# Patient Record
Sex: FEMALE | Race: WHITE | NOT HISPANIC OR LATINO | Employment: FULL TIME | URBAN - METROPOLITAN AREA
[De-identification: names, ages, dates, MRNs, and addresses within clinical notes are randomized per-mention and may not be internally consistent; named-entity substitution may affect disease eponyms.]

---

## 2018-12-27 ENCOUNTER — OFFICE VISIT CONVERTED (OUTPATIENT)
Dept: ONCOLOGY | Facility: HOSPITAL | Age: 38
End: 2018-12-27
Attending: INTERNAL MEDICINE

## 2021-05-28 VITALS
OXYGEN SATURATION: 100 % | SYSTOLIC BLOOD PRESSURE: 124 MMHG | HEIGHT: 68 IN | DIASTOLIC BLOOD PRESSURE: 75 MMHG | TEMPERATURE: 97.7 F | HEART RATE: 84 BPM | RESPIRATION RATE: 16 BRPM | BODY MASS INDEX: 31.14 KG/M2 | WEIGHT: 205.47 LBS

## 2021-05-28 NOTE — PROGRESS NOTES
Patient: EDMUND WEBSTER     Acct: [id number]     Report: #[id number]  UNIT #: [id number]     : 1980    Encounter Date:2018  PRIMARY CARE: NITIN LIANG  ***Signed***  --------------------------------------------------------------------------------------------------------------------  NURSE INTAKE      Visit Type      New Patient Visit            Chief Complaint      UNEXPLAINED BRUISING            Referring Provider/Copies To      Referring Provider:  NITIN LIANG      Primary Care Provider:  NITIN LIANG            History and Present Illness      HPI - Hematology Interim      38-year-old white female who presents today for evaluation of bruising.  Patient    states that she noticed a rash on her trunk and lower extremities in particular     which started a couple of months ago.  She denies any new medications changes in    her toiletry articles etc.  The areas were very itchy and she had a lot of     bruising from scratching.  She was treated with topicals and then steroid     Dosepak which helped some.  She was finally started on Atarax and noted that the    itching has resolved and the rash cleared up.  The bruising is now also majority    really cleared.  She has never had issues with bruising in the past of an     excessive nature.  She has had prior tooth extractions and surgeries without     difficulty.  She had no excessive bleeding with her vaginal deliveries of her     children.  She has no family history of bleeding diathesis.  She is not on any     new medications, over-the-counter remedies or herbal supplements.            Most Recent Lab Findings      18 WBC 7.1, Hgb 13.6, HCT 44.0, MCV 93.2, , sedimentation rate 1      18 BUN 13, creatinine 1, AST 20, ALT 14, alkaline phosphatase 45, T protein    7.2, albumin 4.1, calcium 10.7 otherwise CMP normal            PAST, FAMILY   Past Medical History      Past Medical History:  High Cholesterol       Hematology/Oncology (F):  Anemia            Past Surgical History      BREAST IMPLANTS, ABLASION, D        Family History      FATHER HAS HEART PROBLEMS and stroke. MOTHER AND FATHER STILL LIVING.            Social History      Marital Status:        Lives independently:  Yes      Number of Children:  3      Occupation:  [employer]            Tobacco Use      Tobacco status:  Never smoker            Alcohol Use      Alcohol intake:  None            Substance Use      Substance use:  Denies use            REVIEW OF SYSTEMS      General:  Admits: Fatigue, Weight Gain;          Denies: Appetite Change, Fever, Night Sweats, Weight Loss      Eye:  Denies: Blurred Vision, Corrective Lenses, Diplopia, Eye Irritation, Eye     Pain, Eye Redness, Spots in Vision, Vision Loss      ENT:  Denies: Headache, Hearing Loss, Hoarseness, Seizures, Sinus Congestion,     Sore Throat      Cardiovascular:  Denies: Chest Pain, Edema Ankles, Edema Legs, Irregular     Heartbeat, Palpitations      Respiratory:  Denies: Coughing Blood, Productive Cough, Shortness of Air,     Wheezing      Gastrointestinal:  Denies: Bloody Stools, Constipation, Diarrhea, Frequent     Heartburn, Nausea, Problem Swallowing, Tarry Stools, Unable to Control Bowels,     Vomiting      Genitourinary (female):  Denies: Blood in Urine, Decrease Urine Stream, Frequent    Urination, Incontinence, Painful Urination      Musculoskeletal:  Denies: Back Pain, Leg Cramps, Muscle Pain, Muscle Weakness,     Painful Joints, Swollen Joints      Integumentary:  Admits: Rash;          Denies: Bleeds Easily, Bruises Easily, Hair Changes, Jaundice, Lesions, Mole     Changes, Nail Changes, Pigment Changes, Skin Discoloration      Neurologic:  Denies: Dizziness, Fainting, Numbness\Tingling, Paralysis, Seizures      Psychiatric:  Denies: Anxiety, Confused, Depression, Disoriented, Memory Loss      Endocrine:  Denies: Cold Intolerance, Diabetes, Excessive Sweating,  Excessive     Thirst, Excessive Urination, Heat Intolerance, Flushing, Hyperthyroidism,     Hypothyroidism      Hematologic/Lymphatic:  Admits: Bruising;          Denies: Bleeding, Enlarged Lymph Nodes, Recurrent Infections, Transfusions            VITAL SIGNS AND SCORES      Vitals      Height 5 ft 7.87 in / 172.4 cm      Weight 205 lbs 7.500 oz / 93.2 kg      BSA 2.06 m2      BMI 31.4 kg/m2      Temperature 97.7 F / 36.5 C - Temporal      Pulse 84      Respirations 16      Blood Pressure 124/75 Sitting, Right Arm      Pulse Oximetry 100%, RM AIR            Pain Score      Pain Scale Used:  Numerical      Pain Intensity:  0            Fatigue Score      Fatigue (0-10 scale):  3            EXAM      General Appearance:  Positive for: Alert, Oriented x3, Cooperative;          Negative for: Acute Distress      Neck:  Positive for: Supple;          Negative for: JVD, Masses      Respiratory:  Positive for: CTAB, Normal Respiratory Effort      Abdomen/Gastro:  Positive for: Soft;          Negative for: Distention, Hepatosplenomegaly, Tenderness      Cardiovascular:  Positive for: RRR;          Negative for: Gallop, Murmur, Peripheral Edema, Rub      Skin:  Negative for: Rash      Other      No bruising or petechiae.      Psychiatric:  Positive for: Appropriate Affect, Intact Judgement      Lower Extremities:  Negative for: Edema      Lymphatic:  Negative for: Axillary, Cervical, Infraclavicular, Supraclavicular            PREVENTION      Hx Influenza Vaccination:  No      2 or More Falls Past Year?:  No      Fall Past Year with Injury?:  No      Encouraged to follow-up with:  PCP regarding preventative exams.      Chart initiated by      RENITA LITTLEJOHN MA            ALLERGY/MEDS      Allergies      Coded Allergies:             NO KNOWN DRUG ALLERGIES (Verified  Allergy, Unknown, 12/27/18)            Medications      Last Reconciled on 12/27/18 14:12 by MANI BUENO      Cholecalciferol (Vitamin D*) 2,000 Unit Cap       2000 UNITS PO QDAY, #30 CAP 0 Refills         Reported         12/27/18       Fexofenadine Hcl (Fexofenadine Hcl) 180 Mg Tablet      180 MG PO QDAY, #30 TAB 0 Refills         Reported         12/27/18       hydrOXYzine HCL (Atarax) 10 Mg Tablet      10 MG PO Q4H PRN for ITCHING, #30 TAB 0 Refills         Reported         12/27/18       Loratadine/Pseudoephedrine 10/240 MG (CLARITIN-D 24 HOUR TABLET) Unknown     Strength Tab.er.24h      PO QDAY, TAB         Reported         12/27/18       Phentermine Hcl (Adipex-P) 37.5 Mg Cap      37.5 MG PO QDAY, #30 CAP         Reported         6/20/18       Linaclotide (Linzess) 145 Mcg Capsule      145 MCG PO QDAY, CAP         Reported         6/20/18       Topiramate (Topiramate*) 25 Mg Tablet      25 MG PO BID, TAB         Reported         6/20/18      Medications Reviewed:  Changes made to meds            IMPRESSION/PLAN      Impression      Easy bruising            Diagnosis      Easy bruising - R23.8      Patient has no bruising on examination today and her description is more     consistent with a rash then bruising.  She has had prior surgeries, tooth     extractions and vaginal deliveries without any bleeding complications.  I do not    have strong suspicion that she has an underlying bleeding diathesis but     additional workup is reasonable as outlined below.  Consider dermatology     evaluation if she has recrudescence of the rash.            Notes      New Medications      * Loratadine/Pseudoephedrine 10/240 MG (CLARITIN-D 24 HOUR TABLET) Unknown       Strength TAB.ER.24H: PO QDAY      * hydrOXYzine HCL (Atarax) 10 MG TABLET: 10 MG PO Q4H PRN ITCHING #30      * Fexofenadine Hcl 180 MG TABLET: 180 MG PO QDAY #30      * CHOLECALCIFEROL (Vitamin D*) 2,000 UNIT CAP: 2,000 UNITS PO QDAY #30      Discontinued Medications      * Azithromycin Z-Landry (Zithromax Z-Landry) 250 MG TABLET: 250 MG PO ASDIR #1         Instructions: Take 500 mg (two tablets) by mouth the first day,  then 250 mg        (one tablet) daily until all taken.      * Benzonatate (Tessalon Perles) 100 MG CAP: 100 MG PO TID 5 Days #15      New Diagnostics      * CBC With Auto Diff, Routine         Dx: Abnormal bruising - R23.8      * CMP Comp Metabolic Panel, Routine         Dx: Abnormal bruising - R23.8      * PT/PTT, Routine         Dx: Abnormal bruising - R23.8            Patient Education      Patient Education Provided:  Yes                 Disclaimer: Converted document may not contain table formatting or lab diagrams. Please see [employer] [facility] for the authenticated document.

## 2022-11-16 ENCOUNTER — TELEPHONE (OUTPATIENT)
Dept: PEDIATRICS | Facility: OTHER | Age: 42
End: 2022-11-16

## 2022-11-16 NOTE — TELEPHONE ENCOUNTER
Caller: Ebony Hull    Relationship to patient: Self    Best call back number: [phone]    Chief complaint: NONE     Type of visit: NEW PATIENT     Requested date: WEDNESDAYS ANY TIME    Additional notes: PATIENT CALLING IN TO SCHEDULE NEW PATIENT APPOINTMENT FOR HER AND HER TWO CHILDREN. NOT ABLE TO ACCOMMODATE PATIENTS NEEDS. PLEASE ADVISE.

## 2022-11-17 NOTE — TELEPHONE ENCOUNTER
CALLED AND LEFT A MESSAGE LETTING PATIENT KNOW WE WERE SORRY THAT HER NEEDS WERE NOT ABLE TO BE MET BUT THAT SHE CAN CALL US BACK AND SEE IF WE CAN EITHER ACCOMMODATE HER OR GIVE HER THE # TO ANOTHER OFFICE THAT CAN.

## 2023-10-17 ENCOUNTER — OFFICE VISIT (OUTPATIENT)
Dept: FAMILY MEDICINE CLINIC | Facility: CLINIC | Age: 43
End: 2023-10-17
Payer: COMMERCIAL

## 2023-10-17 VITALS
BODY MASS INDEX: 29.43 KG/M2 | WEIGHT: 210.2 LBS | TEMPERATURE: 98 F | HEIGHT: 71 IN | DIASTOLIC BLOOD PRESSURE: 74 MMHG | SYSTOLIC BLOOD PRESSURE: 122 MMHG | OXYGEN SATURATION: 98 % | HEART RATE: 72 BPM

## 2023-10-17 DIAGNOSIS — G43.719 INTRACTABLE CHRONIC MIGRAINE WITHOUT AURA AND WITHOUT STATUS MIGRAINOSUS: ICD-10-CM

## 2023-10-17 DIAGNOSIS — Z12.31 ENCOUNTER FOR SCREENING MAMMOGRAM FOR MALIGNANT NEOPLASM OF BREAST: ICD-10-CM

## 2023-10-17 DIAGNOSIS — E53.8 B12 DEFICIENCY: ICD-10-CM

## 2023-10-17 DIAGNOSIS — Z51.81 MEDICATION MONITORING ENCOUNTER: ICD-10-CM

## 2023-10-17 DIAGNOSIS — E66.3 OVERWEIGHT WITH BODY MASS INDEX (BMI) OF 29 TO 29.9 IN ADULT: ICD-10-CM

## 2023-10-17 DIAGNOSIS — Z00.00 ANNUAL PHYSICAL EXAM: ICD-10-CM

## 2023-10-17 DIAGNOSIS — K21.9 GASTROESOPHAGEAL REFLUX DISEASE, UNSPECIFIED WHETHER ESOPHAGITIS PRESENT: ICD-10-CM

## 2023-10-17 DIAGNOSIS — Z00.00 ENCOUNTER FOR MEDICAL EXAMINATION TO ESTABLISH CARE: Primary | ICD-10-CM

## 2023-10-17 LAB
ALBUMIN SERPL-MCNC: 4.6 G/DL (ref 3.5–5.2)
ALBUMIN/GLOB SERPL: 1.6 G/DL
ALP SERPL-CCNC: 48 U/L (ref 39–117)
ALT SERPL W P-5'-P-CCNC: 10 U/L (ref 1–33)
AMPHET+METHAMPHET UR QL: NEGATIVE
AMPHETAMINE INTERNAL CONTROL: NORMAL
AMPHETAMINES UR QL: NEGATIVE
ANION GAP SERPL CALCULATED.3IONS-SCNC: 8.2 MMOL/L (ref 5–15)
AST SERPL-CCNC: 14 U/L (ref 1–32)
BARBITURATE INTERNAL CONTROL: NORMAL
BARBITURATES UR QL SCN: NEGATIVE
BASOPHILS # BLD AUTO: 0.07 10*3/MM3 (ref 0–0.2)
BASOPHILS NFR BLD AUTO: 1.2 % (ref 0–1.5)
BENZODIAZ UR QL SCN: NEGATIVE
BENZODIAZEPINE INTERNAL CONTROL: NORMAL
BILIRUB SERPL-MCNC: 0.4 MG/DL (ref 0–1.2)
BUN SERPL-MCNC: 10 MG/DL (ref 6–20)
BUN/CREAT SERPL: 11.6 (ref 7–25)
BUPRENORPHINE INTERNAL CONTROL: NORMAL
BUPRENORPHINE SERPL-MCNC: NEGATIVE NG/ML
CALCIUM SPEC-SCNC: 9.6 MG/DL (ref 8.6–10.5)
CANNABINOIDS SERPL QL: NEGATIVE
CHLORIDE SERPL-SCNC: 104 MMOL/L (ref 98–107)
CHOLEST SERPL-MCNC: 205 MG/DL (ref 0–200)
CO2 SERPL-SCNC: 24.8 MMOL/L (ref 22–29)
COCAINE INTERNAL CONTROL: NORMAL
COCAINE UR QL: NEGATIVE
CREAT SERPL-MCNC: 0.86 MG/DL (ref 0.57–1)
DEPRECATED RDW RBC AUTO: 37.5 FL (ref 37–54)
EGFRCR SERPLBLD CKD-EPI 2021: 87.2 ML/MIN/1.73
EOSINOPHIL # BLD AUTO: 0.15 10*3/MM3 (ref 0–0.4)
EOSINOPHIL NFR BLD AUTO: 2.6 % (ref 0.3–6.2)
ERYTHROCYTE [DISTWIDTH] IN BLOOD BY AUTOMATED COUNT: 11.9 % (ref 12.3–15.4)
EXPIRATION DATE: NORMAL
FOLATE SERPL-MCNC: 8.74 NG/ML (ref 4.78–24.2)
GLOBULIN UR ELPH-MCNC: 2.9 GM/DL
GLUCOSE SERPL-MCNC: 95 MG/DL (ref 65–99)
HCT VFR BLD AUTO: 41.2 % (ref 34–46.6)
HDLC SERPL-MCNC: 45 MG/DL (ref 40–60)
HGB BLD-MCNC: 13.9 G/DL (ref 12–15.9)
IMM GRANULOCYTES # BLD AUTO: 0.01 10*3/MM3 (ref 0–0.05)
IMM GRANULOCYTES NFR BLD AUTO: 0.2 % (ref 0–0.5)
LDLC SERPL CALC-MCNC: 138 MG/DL (ref 0–100)
LDLC/HDLC SERPL: 3.02 {RATIO}
LYMPHOCYTES # BLD AUTO: 1.27 10*3/MM3 (ref 0.7–3.1)
LYMPHOCYTES NFR BLD AUTO: 22.4 % (ref 19.6–45.3)
Lab: NORMAL
MCH RBC QN AUTO: 29.3 PG (ref 26.6–33)
MCHC RBC AUTO-ENTMCNC: 33.7 G/DL (ref 31.5–35.7)
MCV RBC AUTO: 86.7 FL (ref 79–97)
MDMA (ECSTASY) INTERNAL CONTROL: NORMAL
MDMA UR QL SCN: NEGATIVE
METHADONE INTERNAL CONTROL: NORMAL
METHADONE UR QL SCN: NEGATIVE
METHAMPHETAMINE INTERNAL CONTROL: NORMAL
MONOCYTES # BLD AUTO: 0.31 10*3/MM3 (ref 0.1–0.9)
MONOCYTES NFR BLD AUTO: 5.5 % (ref 5–12)
NEUTROPHILS NFR BLD AUTO: 3.86 10*3/MM3 (ref 1.7–7)
NEUTROPHILS NFR BLD AUTO: 68.1 % (ref 42.7–76)
NRBC BLD AUTO-RTO: 0 /100 WBC (ref 0–0.2)
OPIATES INTERNAL CONTROL: NORMAL
OPIATES UR QL: NEGATIVE
OXYCODONE INTERNAL CONTROL: NORMAL
OXYCODONE UR QL SCN: NEGATIVE
PCP UR QL SCN: NEGATIVE
PHENCYCLIDINE INTERNAL CONTROL: NORMAL
PLATELET # BLD AUTO: 291 10*3/MM3 (ref 140–450)
PMV BLD AUTO: 10.4 FL (ref 6–12)
POTASSIUM SERPL-SCNC: 4.1 MMOL/L (ref 3.5–5.2)
PROT SERPL-MCNC: 7.5 G/DL (ref 6–8.5)
RBC # BLD AUTO: 4.75 10*6/MM3 (ref 3.77–5.28)
SODIUM SERPL-SCNC: 137 MMOL/L (ref 136–145)
THC INTERNAL CONTROL: NORMAL
TRIGL SERPL-MCNC: 121 MG/DL (ref 0–150)
TSH SERPL DL<=0.05 MIU/L-ACNC: 1.35 UIU/ML (ref 0.27–4.2)
VIT B12 BLD-MCNC: 335 PG/ML (ref 211–946)
VLDLC SERPL-MCNC: 22 MG/DL (ref 5–40)
WBC NRBC COR # BLD: 5.67 10*3/MM3 (ref 3.4–10.8)

## 2023-10-17 PROCEDURE — 82746 ASSAY OF FOLIC ACID SERUM: CPT

## 2023-10-17 PROCEDURE — 80050 GENERAL HEALTH PANEL: CPT

## 2023-10-17 PROCEDURE — 80061 LIPID PANEL: CPT

## 2023-10-17 PROCEDURE — 82607 VITAMIN B-12: CPT

## 2023-10-17 RX ORDER — OMEPRAZOLE 20 MG/1
20 CAPSULE, DELAYED RELEASE ORAL DAILY
Qty: 30 CAPSULE | Refills: 5 | Status: SHIPPED | OUTPATIENT
Start: 2023-10-17

## 2023-10-17 RX ORDER — RIZATRIPTAN BENZOATE 10 MG/1
10 TABLET, ORALLY DISINTEGRATING ORAL ONCE AS NEEDED
Qty: 12 TABLET | Refills: 1 | Status: SHIPPED | OUTPATIENT
Start: 2023-10-17

## 2023-10-17 NOTE — PROGRESS NOTES
Chief Complaint  Chief Complaint   Patient presents with    Establish Care    Weight management     Would like to discuss options for weight loss    Migraine    Heartburn       Subjective      Ebony Hull presents to [facility]  The patient presents today to establish care and for a physical examination.     The patient was being seen at [facility] [place] [facility], but they have closed. She has not been able to get her medical records.     About 12 years ago, the patient had an emergency D&C ablation and tubal ligation. She was having heavy menses with blood clots. She had a Pap smear a couple of years ago and has never had an abnormal Pap smear. She has not had a mammogram before.     She states that her migraines are more triggered by smells such as cologne, smoke, body odor. If she took the Maxalt at the right time she would not have to take the other preventative medication.     The patient believes she may have PCOS.  She reports that her sister has been diagnosed with PCOS.  Her menses have never been regular since the surgery. Some menses are heavier than others. She did have difficulty getting pregnant.     She has lost about 8 pounds and has been going to [facility] for 6 months. She does not drink coffee or sodas. She drinks a lot of water. She does not eat as much as she used to, but she does eat pasta. She loses a few pounds then plateaus. She was on phentermine previously for weight loss before she got pregnant, and it worked well for her. She went to a weight loss clinic in [place] that provided vitamins and supplements.     She states her B12 has always been low. She was taking iron for excessive bruising, but it causes constipation. She has less bruising on her lower extremities. She was advised to try MiraLAX and stool softeners for constipation.     She has not been sleeping well but thinks it may be from recent move. She denies a history of cardiac issues  "or hypertension.     She has had acid reflux for 19 years and it is wearing away the enamel on her teeth. She has never had an EGD.  Her dentist believes her acid reflux causes her dental issues.  She was previously taking a medication for GERD, believed to be Prilosec, but she has not taken this in several months.    The patient has 3 children ages 19, 14 and 12. The patient has been  for 10 years.     Objective     Medical History:  Past Medical History:   Diagnosis Date    Acid reflux     Migraine      Past Surgical History:   Procedure Laterality Date    DILATATION AND CURETTAGE      ENDOMETRIAL ABLATION      LAPAROSCOPIC TUBAL LIGATION        Social History     Tobacco Use    Smoking status: Never    Smokeless tobacco: Never   Vaping Use    Vaping Use: Never used   Substance Use Topics    Alcohol use: Never     Family History   Problem Relation Age of Onset    Stroke Father     Heart disease Father        Medications:  Prior to Admission medications    Not on File        Allergies:   Patient has no known allergies.    Health Maintenance Due   Topic Date Due    BMI FOLLOWUP  Never done    TDAP/TD VACCINES (1 - Tdap) Never done    HEPATITIS C SCREENING  Never done    ANNUAL PHYSICAL  Never done         Vital Signs:   /74 (BP Location: Left arm, Patient Position: Sitting, Cuff Size: Adult)   Pulse 72   Temp 98 °F (36.7 °C) (Oral)   Ht 180.3 cm (71\")   Wt 95.3 kg (210 lb 3.2 oz)   SpO2 98%   BMI 29.32 kg/m²     Wt Readings from Last 3 Encounters:   10/17/23 95.3 kg (210 lb 3.2 oz)   12/27/18 93.2 kg (205 lb 7.5 oz)     BP Readings from Last 3 Encounters:   10/17/23 122/74   12/27/18 124/75       BMI is >= 25 and <30. (Overweight) The following options were offered after discussion;: exercise counseling/recommendations, nutrition counseling/recommendations, and pharmacological intervention options       Physical Exam  Vitals reviewed.   Constitutional:       Appearance: Normal appearance. She is " well-developed.   HENT:      Head: Normocephalic and atraumatic.   Eyes:      Conjunctiva/sclera: Conjunctivae normal.      Pupils: Pupils are equal, round, and reactive to light.   Cardiovascular:      Rate and Rhythm: Normal rate and regular rhythm.      Heart sounds: No murmur heard.     No friction rub. No gallop.   Pulmonary:      Effort: Pulmonary effort is normal.      Breath sounds: Normal breath sounds. No wheezing or rhonchi.   Abdominal:      General: Bowel sounds are normal. There is no distension.      Palpations: Abdomen is soft.      Tenderness: There is no abdominal tenderness.   Skin:     General: Skin is warm and dry.   Neurological:      Mental Status: She is alert and oriented to person, place, and time.      Cranial Nerves: No cranial nerve deficit.   Psychiatric:         Mood and Affect: Mood and affect normal.         Behavior: Behavior normal.         Thought Content: Thought content normal.         Judgment: Judgment normal.         Result Review :    The following data was reviewed by PIO Frazier on 10/17/23 at 13:20 EDT:        No Images in the past 120 days found..               Assessment and Plan    Diagnoses and all orders for this visit:    1. Encounter for medical examination to establish care (Primary)  Comments:  Will put in referral for 3D mammogram.   Will order laboratory studies.    2. Annual physical exam  -     CBC & Differential  -     Comprehensive Metabolic Panel  -     Lipid Panel  -     TSH Rfx On Abnormal To Free T4    3. Intractable chronic migraine without aura and without status migrainosus  -     rizatriptan MLT (Maxalt-MLT) 10 MG disintegrating tablet; Place 1 tablet on the tongue 1 (One) Time As Needed for Migraine. May repeat in 2 hours if needed  Dispense: 12 tablet; Refill: 1    4. Gastroesophageal reflux disease, unspecified whether esophagitis present  -     omeprazole (priLOSEC) 20 MG capsule; Take 1 capsule by mouth Daily.  Dispense: 30  capsule; Refill: 5    5. Encounter for screening mammogram for malignant neoplasm of breast  -     Mammo Screening Digital Tomosynthesis Bilateral With CAD; Future    6. B12 deficiency  -     Vitamin B12  -     Folate    7. Overweight with body mass index (BMI) of 29 to 29.9 in adult    8. Medication monitoring encounter  -     POC Urine Drug Screen Premier [facility]-C[facility]       Health maintenance  Will put in referral for 3D mammogram.   Will order laboratory studies.     Weight   Discussed injectable medications after we have reviewed her laboratory studies.   Advised to check with her health insurance to see if injectable medications are covered.   Discussed starting phentermine after we have reviewed laboratory studies. Discussed side effects.     GERD  Will prescribe omeprazole.     Migraines  Will prescribe Maxalt.   Risk and benefits of stimulant medications were discussed with the patient today including risk of diversion and abuse.  Discussed need for routine monitoring and need to adjust medication or discontinue as appropriate.    UDS and Lino report reviewed and appropriate.  If labs are normal patient is interested in initiating phentermine for weight loss.  If phentermine is initiated patient will follow-up with me in 1 month to monitor tolerance and effectiveness of medication changes.        Smoking Cessation:    Ebony Hull  reports that she has never smoked. She has never used smokeless tobacco.    Follow Up   Return in about 1 month (around 11/17/2023) for Next scheduled follow up.  Patient was given instructions and counseling regarding her condition or for health maintenance advice. Please see specific information pulled into the AVS if appropriate.     Please note that portions of this note were completed with a voice recognition program.    Transcribed from ambient dictation for PIO Frazier by Monique Palacios.  10/17/23   11:32 EDT    Patient or patient representative verbalized  consent to the visit recording.  I have personally performed the services described in this document as transcribed by the above individual, and it is both accurate and complete.

## 2023-10-18 ENCOUNTER — TELEPHONE (OUTPATIENT)
Dept: FAMILY MEDICINE CLINIC | Facility: CLINIC | Age: 43
End: 2023-10-18

## 2023-10-18 DIAGNOSIS — E66.3 OVERWEIGHT WITH BODY MASS INDEX (BMI) OF 29 TO 29.9 IN ADULT: Primary | ICD-10-CM

## 2023-10-18 RX ORDER — PHENTERMINE HYDROCHLORIDE 37.5 MG/1
37.5 TABLET ORAL
Qty: 30 TABLET | Refills: 0 | Status: SHIPPED | OUTPATIENT
Start: 2023-10-18

## 2023-10-18 NOTE — TELEPHONE ENCOUNTER
Caller: Ebony Hull    Relationship: Self    Best call back number: [phone]    Caller requesting test results: PATIENT    What test was performed: LABS    When was the test performed: YESTERDAY    Where was the test performed: IN OFFICE    Additional notes: PATIENT RECEIVED A CALL AND VOICEMAIL ABOUT GOING OVER LAB RESULTS. SHE WAS RETURNING THIS CALL. I WAS UNABLE TO WARM TRANSFER THIS CALL.

## 2023-11-20 DIAGNOSIS — E66.3 OVERWEIGHT WITH BODY MASS INDEX (BMI) OF 29 TO 29.9 IN ADULT: ICD-10-CM

## 2023-11-20 DIAGNOSIS — E66.3 OVERWEIGHT WITH BODY MASS INDEX (BMI) OF 29 TO 29.9 IN ADULT: Primary | ICD-10-CM

## 2023-11-20 RX ORDER — PHENTERMINE HYDROCHLORIDE 37.5 MG/1
37.5 TABLET ORAL
Qty: 30 TABLET | Refills: 0 | Status: SHIPPED | OUTPATIENT
Start: 2023-11-20

## 2023-11-20 RX ORDER — PHENTERMINE HYDROCHLORIDE 37.5 MG/1
37.5 TABLET ORAL
Qty: 30 TABLET | Refills: 0 | Status: CANCELLED | OUTPATIENT
Start: 2023-11-20

## 2023-12-05 ENCOUNTER — OFFICE VISIT (OUTPATIENT)
Dept: FAMILY MEDICINE CLINIC | Facility: CLINIC | Age: 43
End: 2023-12-05
Payer: COMMERCIAL

## 2023-12-05 VITALS
DIASTOLIC BLOOD PRESSURE: 72 MMHG | SYSTOLIC BLOOD PRESSURE: 108 MMHG | HEART RATE: 75 BPM | WEIGHT: 198.8 LBS | TEMPERATURE: 98.1 F | BODY MASS INDEX: 27.83 KG/M2 | HEIGHT: 71 IN | OXYGEN SATURATION: 99 %

## 2023-12-05 DIAGNOSIS — K59.04 FUNCTIONAL CONSTIPATION: ICD-10-CM

## 2023-12-05 DIAGNOSIS — Z01.419 WELL WOMAN EXAM: ICD-10-CM

## 2023-12-05 DIAGNOSIS — G43.719 INTRACTABLE CHRONIC MIGRAINE WITHOUT AURA AND WITHOUT STATUS MIGRAINOSUS: ICD-10-CM

## 2023-12-05 DIAGNOSIS — K58.1 IRRITABLE BOWEL SYNDROME WITH CONSTIPATION: ICD-10-CM

## 2023-12-05 DIAGNOSIS — E66.3 OVERWEIGHT WITH BODY MASS INDEX (BMI) OF 29 TO 29.9 IN ADULT: Primary | ICD-10-CM

## 2023-12-05 RX ORDER — PHENTERMINE HYDROCHLORIDE 37.5 MG/1
37.5 CAPSULE ORAL EVERY MORNING
Qty: 30 CAPSULE | Refills: 0 | Status: SHIPPED | OUTPATIENT
Start: 2023-12-05

## 2023-12-05 NOTE — PROGRESS NOTES
Chief Complaint  Chief Complaint   Patient presents with    Migraine     On Maxalt    Obesity     On phentermine    Constipation     Would like to discuss medication options       Subjective      Ebony Hull presents to [facility]  The patient is a 43-year-old female who comes in today to follow up on migraines and obesity. When previously seen, she was prescribed Maxalt to be taken as needed for migraine headaches. She was also initiated on phentermine to be taken daily for weight loss. Looking at her weight trend, when she was seen in 10/2022, she was at 210 pounds. Today, she is down to 198 pounds with an approximate 12-pound weight loss.    She has been taking phentermine tablet for 1.5 months. She can tell a difference in her weight. Her weight fluctuates. She has been under a lot of stress over the last 1.5 months due to her uncles and sister's passing and work associated stress.    She began taking MiraLAX every day to help soften her stool. It makes her stool softer, but she is still constipated. She has a bowel movement once a week. She takes Ex-Lax when she experiences pain in her stomach. She has been on MiraLAX and a stool softener since she was a child. She has always struggled with constipation. She has not seen a gastroenterologist or had a colonoscopy. She has abdominal pain after a couple of days due to constipation. When she becomes nauseous, she experiences pain in her back. She still has her gallbladder.    Her migraines have improved. Maxalt is working well for her.    Her heartburn medication has been working well.    Her last Pap smear was 11 years ago when she had a D&C with an ablation performed.    Objective     Medical History:  Past Medical History:   Diagnosis Date    Acid reflux     Migraine      Past Surgical History:   Procedure Laterality Date    DILATATION AND CURETTAGE      ENDOMETRIAL ABLATION      LAPAROSCOPIC TUBAL LIGATION        Social  "History     Tobacco Use    Smoking status: Never    Smokeless tobacco: Never   Vaping Use    Vaping Use: Never used   Substance Use Topics    Alcohol use: Never     Family History   Problem Relation Age of Onset    Stroke Father     Heart disease Father        Medications:  Prior to Admission medications    Medication Sig Start Date End Date Taking? Authorizing Provider   omeprazole (priLOSEC) 20 MG capsule Take 1 capsule by mouth Daily. 10/17/23  Yes Jennifer Li APRN   phentermine (ADIPEX-P) 37.5 MG tablet Take 1 tablet by mouth Every Morning Before Breakfast. 11/20/23  Yes Jennifer Li APRN   rizatriptan MLT (Maxalt-MLT) 10 MG disintegrating tablet Place 1 tablet on the tongue 1 (One) Time As Needed for Migraine. May repeat in 2 hours if needed 10/17/23  Yes Jennifer Li APRN        Allergies:   Patient has no known allergies.    Health Maintenance Due   Topic Date Due    TDAP/TD VACCINES (1 - Tdap) Never done    HEPATITIS C SCREENING  Never done    PAP SMEAR  Never done         Vital Signs:   /72 (BP Location: Left arm, Patient Position: Sitting, Cuff Size: Adult)   Pulse 75   Temp 98.1 °F (36.7 °C) (Oral)   Ht 180.3 cm (71\")   Wt 90.2 kg (198 lb 12.8 oz)   SpO2 99%   BMI 27.73 kg/m²     Wt Readings from Last 3 Encounters:   12/05/23 90.2 kg (198 lb 12.8 oz)   10/17/23 95.3 kg (210 lb 3.2 oz)   12/27/18 93.2 kg (205 lb 7.5 oz)     BP Readings from Last 3 Encounters:   12/05/23 108/72   10/17/23 122/74   12/27/18 124/75       Physical Exam  Vitals reviewed.   Constitutional:       Appearance: Normal appearance. She is well-developed.   HENT:      Head: Normocephalic and atraumatic.   Eyes:      Conjunctiva/sclera: Conjunctivae normal.      Pupils: Pupils are equal, round, and reactive to light.   Cardiovascular:      Rate and Rhythm: Normal rate and regular rhythm.      Heart sounds: No murmur heard.     No friction rub. No gallop.   Pulmonary:      Effort: Pulmonary " effort is normal.      Breath sounds: Normal breath sounds. No wheezing or rhonchi.   Abdominal:      General: Bowel sounds are normal. There is no distension.      Palpations: Abdomen is soft.      Tenderness: There is no abdominal tenderness.   Skin:     General: Skin is warm and dry.   Neurological:      Mental Status: She is alert and oriented to person, place, and time.      Cranial Nerves: No cranial nerve deficit.   Psychiatric:         Mood and Affect: Mood and affect normal.         Behavior: Behavior normal.         Thought Content: Thought content normal.         Judgment: Judgment normal.           Result Review :    The following data was reviewed by PIO Frazier on 12/05/23 at 14:06 EST:    Common labs          10/17/2023    08:16   Common Labs   Glucose 95    BUN 10    Creatinine 0.86    Sodium 137    Potassium 4.1    Chloride 104    Calcium 9.6    Albumin 4.6    Total Bilirubin 0.4    Alkaline Phosphatase 48    AST (SGOT) 14    ALT (SGPT) 10    WBC 5.67    Hemoglobin 13.9    Hematocrit 41.2    Platelets 291    Total Cholesterol 205    Triglycerides 121    HDL Cholesterol 45    LDL Cholesterol  138        No Images in the past 120 days found..               Assessment and Plan    Diagnoses and all orders for this visit:    1. Overweight with body mass index (BMI) of 29 to 29.9 in adult (Primary)  -     phentermine 37.5 MG capsule; Take 1 capsule by mouth Every Morning.  Dispense: 30 capsule; Refill: 0    2. Intractable chronic migraine without aura and without status migrainosus    3. Irritable bowel syndrome with constipation  -     linaclotide (Linzess) 72 MCG capsule capsule; Take 1 capsule by mouth Every Morning Before Breakfast.  Dispense: 30 capsule; Refill: 2    4. Well woman exam  -     Ambulatory Referral to Gynecology    5. Functional constipation  -     linaclotide (Linzess) 72 MCG capsule capsule; Take 1 capsule by mouth Every Morning Before Breakfast.  Dispense: 30 capsule;  Refill: 2        1. Migraines.  Her migraines have improved. She will continue Maxalt.    2. Obesity.  She has lost about 12 pounds. She was given a refill of phentermine capsule.    3. Chronic constipation.  She was given a prescription for Linzess 72 mcg once daily. She can continue MiraLAX as needed. If she notices that she is having more regular bowel movements, she can back off on the MiraLAX and the Ex-Lax and take MiraLAX as needed.    4. Health maintenance.  She was provided a referral for a well-woman exam. She will call and schedule a mammogram.        Smoking Cessation:    Ebony Hull  reports that she has never smoked. She has never used smokeless tobacco..         Follow Up   Return in about 3 months (around 3/5/2024) for Next scheduled follow up.  Patient was given instructions and counseling regarding her condition or for health maintenance advice. Please see specific information pulled into the AVS if appropriate.     Please note that portions of this note were completed with a voice recognition program.    Transcribed from ambient dictation for PIO Frazier by Anushka Sanders.  12/05/23   09:09 EST    Patient or patient representative verbalized consent to the visit recording.  I have personally performed the services described in this document as transcribed by the above individual, and it is both accurate and complete.

## 2024-01-18 DIAGNOSIS — K21.9 GASTROESOPHAGEAL REFLUX DISEASE, UNSPECIFIED WHETHER ESOPHAGITIS PRESENT: ICD-10-CM

## 2024-01-18 RX ORDER — OMEPRAZOLE 20 MG/1
20 CAPSULE, DELAYED RELEASE ORAL DAILY
Qty: 30 CAPSULE | Refills: 5 | Status: SHIPPED | OUTPATIENT
Start: 2024-01-18

## 2024-01-27 DIAGNOSIS — E66.3 OVERWEIGHT WITH BODY MASS INDEX (BMI) OF 29 TO 29.9 IN ADULT: ICD-10-CM

## 2024-01-29 NOTE — TELEPHONE ENCOUNTER
Controlled Medication Refill Request:    1.  Last Office Visit:  12/05/2023    2.  Next Office Visit:  Visit date not found     3.  Last UDS Date:  10/17/2023    4.  Last Consent Signed:  10/17/2023    5.  Medication Requested: Phentermine    Pharmacy:   [id number] - [place] - [address] AT [facility] - [phone]  - [phone]   [address] [place] [address]  Phone: [phone] Fax: [phone]

## 2024-01-30 DIAGNOSIS — G43.719 INTRACTABLE CHRONIC MIGRAINE WITHOUT AURA AND WITHOUT STATUS MIGRAINOSUS: ICD-10-CM

## 2024-01-30 RX ORDER — PHENTERMINE HYDROCHLORIDE 37.5 MG/1
TABLET ORAL EVERY MORNING
Qty: 30 TABLET | Refills: 0 | Status: SHIPPED | OUTPATIENT
Start: 2024-01-30

## 2024-01-30 RX ORDER — RIZATRIPTAN BENZOATE 10 MG/1
TABLET, ORALLY DISINTEGRATING ORAL
Qty: 12 TABLET | Refills: 1 | Status: SHIPPED | OUTPATIENT
Start: 2024-01-30

## 2024-01-31 ENCOUNTER — PRIOR AUTHORIZATION (OUTPATIENT)
Dept: FAMILY MEDICINE CLINIC | Facility: CLINIC | Age: 44
End: 2024-01-31
Payer: COMMERCIAL

## 2024-01-31 NOTE — TELEPHONE ENCOUNTER
Rizatriptan Benzoate 10MG dispersible tablets [place] APPROVAL    PA Case ID #: [id number]  Rx #: [id number]  Need Help? Call us at [phone]  Outcome  Approved today  PA Case: [id number], Status: Approved, Coverage Starts on: 1/31/2024 12:00:00 AM, Coverage Ends on: 1/30/2025 12:00:00 AM.  Authorization Expiration Date: 1/29/2025

## 2024-03-03 DIAGNOSIS — K59.04 FUNCTIONAL CONSTIPATION: ICD-10-CM

## 2024-03-03 DIAGNOSIS — K58.1 IRRITABLE BOWEL SYNDROME WITH CONSTIPATION: ICD-10-CM

## 2024-03-04 RX ORDER — LINACLOTIDE 72 UG/1
72 CAPSULE, GELATIN COATED ORAL
Qty: 30 CAPSULE | Refills: 2 | Status: SHIPPED | OUTPATIENT
Start: 2024-03-04

## 2024-03-08 DIAGNOSIS — E66.3 OVERWEIGHT WITH BODY MASS INDEX (BMI) OF 29 TO 29.9 IN ADULT: ICD-10-CM

## 2024-03-08 RX ORDER — PHENTERMINE HYDROCHLORIDE 37.5 MG/1
37.5 TABLET ORAL EVERY MORNING
Qty: 30 TABLET | OUTPATIENT
Start: 2024-03-08

## 2024-03-08 RX ORDER — PHENTERMINE HYDROCHLORIDE 37.5 MG/1
TABLET ORAL EVERY MORNING
Qty: 30 TABLET | Refills: 0 | OUTPATIENT
Start: 2024-03-08

## 2024-03-18 DIAGNOSIS — E66.3 OVERWEIGHT WITH BODY MASS INDEX (BMI) OF 29 TO 29.9 IN ADULT: ICD-10-CM

## 2024-03-18 RX ORDER — PHENTERMINE HYDROCHLORIDE 37.5 MG/1
37.5 TABLET ORAL EVERY MORNING
Qty: 30 TABLET | Refills: 1 | Status: CANCELLED | OUTPATIENT
Start: 2024-03-18

## 2024-05-14 DIAGNOSIS — K59.04 FUNCTIONAL CONSTIPATION: ICD-10-CM

## 2024-05-14 DIAGNOSIS — K58.1 IRRITABLE BOWEL SYNDROME WITH CONSTIPATION: ICD-10-CM

## 2024-05-14 RX ORDER — LINACLOTIDE 72 UG/1
72 CAPSULE, GELATIN COATED ORAL
Qty: 30 CAPSULE | Refills: 2 | Status: SHIPPED | OUTPATIENT
Start: 2024-05-14

## 2024-08-09 DIAGNOSIS — K59.04 FUNCTIONAL CONSTIPATION: ICD-10-CM

## 2024-08-09 DIAGNOSIS — K21.9 GASTROESOPHAGEAL REFLUX DISEASE, UNSPECIFIED WHETHER ESOPHAGITIS PRESENT: ICD-10-CM

## 2024-08-09 DIAGNOSIS — G43.719 INTRACTABLE CHRONIC MIGRAINE WITHOUT AURA AND WITHOUT STATUS MIGRAINOSUS: ICD-10-CM

## 2024-08-09 DIAGNOSIS — K58.1 IRRITABLE BOWEL SYNDROME WITH CONSTIPATION: ICD-10-CM

## 2024-08-09 RX ORDER — RIZATRIPTAN BENZOATE 10 MG/1
10 TABLET, ORALLY DISINTEGRATING ORAL ONCE AS NEEDED
Qty: 12 TABLET | Refills: 1 | Status: SHIPPED | OUTPATIENT
Start: 2024-08-09

## 2024-08-09 RX ORDER — OMEPRAZOLE 20 MG/1
20 CAPSULE, DELAYED RELEASE ORAL DAILY
Qty: 30 CAPSULE | Refills: 5 | Status: SHIPPED | OUTPATIENT
Start: 2024-08-09

## 2024-08-13 RX ORDER — RIZATRIPTAN BENZOATE 10 MG/1
10 TABLET ORAL ONCE AS NEEDED
Qty: 16 TABLET | Refills: 1 | Status: SHIPPED | OUTPATIENT
Start: 2024-08-13

## 2024-09-19 ENCOUNTER — TELEMEDICINE (OUTPATIENT)
Dept: FAMILY MEDICINE CLINIC | Facility: TELEHEALTH | Age: 44
End: 2024-09-19
Payer: COMMERCIAL

## 2024-09-19 DIAGNOSIS — J01.00 ACUTE MAXILLARY SINUSITIS, RECURRENCE NOT SPECIFIED: Primary | ICD-10-CM

## 2024-09-19 RX ORDER — BROMPHENIRAMINE MALEATE, PSEUDOEPHEDRINE HYDROCHLORIDE, AND DEXTROMETHORPHAN HYDROBROMIDE 2; 30; 10 MG/5ML; MG/5ML; MG/5ML
10 SYRUP ORAL 4 TIMES DAILY PRN
Qty: 200 ML | Refills: 0 | Status: SHIPPED | OUTPATIENT
Start: 2024-09-19

## 2024-09-19 RX ORDER — PREDNISONE 10 MG/1
TABLET ORAL
Qty: 21 TABLET | Refills: 0 | Status: SHIPPED | OUTPATIENT
Start: 2024-09-19

## 2024-11-08 DIAGNOSIS — K58.1 IRRITABLE BOWEL SYNDROME WITH CONSTIPATION: ICD-10-CM

## 2024-11-08 DIAGNOSIS — K59.04 FUNCTIONAL CONSTIPATION: ICD-10-CM

## 2024-11-08 RX ORDER — LINACLOTIDE 72 UG/1
72 CAPSULE, GELATIN COATED ORAL
Qty: 30 CAPSULE | Refills: 2 | Status: SHIPPED | OUTPATIENT
Start: 2024-11-08

## 2025-05-25 DIAGNOSIS — K21.9 GASTROESOPHAGEAL REFLUX DISEASE, UNSPECIFIED WHETHER ESOPHAGITIS PRESENT: ICD-10-CM

## 2025-05-27 RX ORDER — OMEPRAZOLE 20 MG/1
20 CAPSULE, DELAYED RELEASE ORAL DAILY
Qty: 30 CAPSULE | Refills: 5 | OUTPATIENT
Start: 2025-05-27